# Patient Record
Sex: MALE | URBAN - METROPOLITAN AREA
[De-identification: names, ages, dates, MRNs, and addresses within clinical notes are randomized per-mention and may not be internally consistent; named-entity substitution may affect disease eponyms.]

---

## 2024-11-23 ENCOUNTER — CLAIMS CREATED FROM THE CLAIM WINDOW (OUTPATIENT)
Dept: URBAN - METROPOLITAN AREA MEDICAL CENTER 10 | Facility: MEDICAL CENTER | Age: 34
End: 2024-11-23

## 2024-11-23 PROCEDURE — 99254 IP/OBS CNSLTJ NEW/EST MOD 60: CPT | Performed by: INTERNAL MEDICINE

## 2024-11-23 PROCEDURE — 99254 IP/OBS CNSLTJ NEW/EST MOD 60: CPT | Performed by: PHYSICIAN ASSISTANT

## 2024-11-25 ENCOUNTER — TELEPHONE ENCOUNTER (OUTPATIENT)
Dept: URBAN - METROPOLITAN AREA CLINIC 37 | Facility: CLINIC | Age: 34
End: 2024-11-25

## 2024-11-26 ENCOUNTER — P2P PATIENT RECORD (OUTPATIENT)
Age: 34
End: 2024-11-26

## 2024-12-13 ENCOUNTER — LAB OUTSIDE AN ENCOUNTER (OUTPATIENT)
Dept: URBAN - METROPOLITAN AREA CLINIC 37 | Facility: CLINIC | Age: 34
End: 2024-12-13

## 2024-12-13 ENCOUNTER — DASHBOARD ENCOUNTERS (OUTPATIENT)
Age: 34
End: 2024-12-13

## 2024-12-13 ENCOUNTER — OFFICE VISIT (OUTPATIENT)
Dept: URBAN - METROPOLITAN AREA CLINIC 37 | Facility: CLINIC | Age: 34
End: 2024-12-13
Payer: COMMERCIAL

## 2024-12-13 VITALS — BODY MASS INDEX: 31.21 KG/M2 | HEIGHT: 70 IN | WEIGHT: 218 LBS

## 2024-12-13 DIAGNOSIS — F10.11 HISTORY OF ALCOHOL ABUSE: ICD-10-CM

## 2024-12-13 DIAGNOSIS — R74.8 ELEVATED LIVER ENZYMES: ICD-10-CM

## 2024-12-13 DIAGNOSIS — R93.5 ABNORMAL FINDINGS ON DIAGNOSTIC IMAGING OF OTHER ABDOMINAL REGIONS, INCLUDING RETROPERITONEUM: ICD-10-CM

## 2024-12-13 DIAGNOSIS — Z87.19 HISTORY OF PANCREATITIS: ICD-10-CM

## 2024-12-13 PROBLEM — 371434005: Status: ACTIVE | Noted: 2024-12-13

## 2024-12-13 PROCEDURE — 99203 OFFICE O/P NEW LOW 30 MIN: CPT | Performed by: NURSE PRACTITIONER

## 2024-12-13 RX ORDER — HYDROXYZINE PAMOATE 50 MG/1
TAKE 1 CAPSULE BY MOUTH TWICE DAILY AS NEEDED FOR ANXIETY CAPSULE ORAL
Qty: 30 EACH | Refills: 0 | Status: ACTIVE | COMMUNITY

## 2024-12-13 RX ORDER — BUPROPION HYDROCHLORIDE 150 MG/1
TABLET, EXTENDED RELEASE ORAL
Qty: 90 TABLET | Status: ACTIVE | COMMUNITY

## 2024-12-13 RX ORDER — CLONAZEPAM 1 MG/1
TAKE 1 TABLET BY MOUTH TWICE DAILY AS NEEDED FOR ANXIETY TABLET ORAL
Qty: 60 EACH | Refills: 2 | Status: ACTIVE | COMMUNITY

## 2024-12-13 RX ORDER — TRAZODONE HYDROCHLORIDE 100 MG/1
TABLET ORAL
Qty: 30 TABLET | Status: ACTIVE | COMMUNITY

## 2024-12-13 RX ORDER — DEXTROAMPHETAMINE SACCHARATE, AMPHETAMINE ASPARTATE, DEXTROAMPHETAMINE SULFATE AND AMPHETAMINE SULFATE 3.75; 3.75; 3.75; 3.75 MG/1; MG/1; MG/1; MG/1
TABLET ORAL
Qty: 60 TABLET | Status: ACTIVE | COMMUNITY

## 2024-12-13 NOTE — PHYSICAL EXAM CONSTITUTIONAL:
[Use of Plain Language] : use of plain language well developed, well nourished , in no acute distress , ambulating without difficulty , normal communication ability [Adequate] : adequate [None] : none [] : I have reviewed management goals with caretaker and provided a copy of care plan

## 2024-12-13 NOTE — PHYSICAL EXAM NEUROLOGIC:
oriented to person, place and time , normal sensation , short and long term memory intact Alert-The patient is alert, awake and responds to voice. The patient is oriented to time, place, and person. The triage nurse is able to obtain subjective information.

## 2024-12-13 NOTE — PHYSICAL EXAM GASTROINTESTINAL
Abdomen , soft, nontender, nondistended , no guarding or rigidity , no masses palpable , normal bowel sounds , Liver and Spleen , no hepatomegaly present , no hepatosplenomegaly , liver nontender , spleen not palpable
Self Administrated

## 2024-12-20 LAB
ABSOLUTE BASOPHILS: 22
ABSOLUTE EOSINOPHILS: 52
ABSOLUTE LYMPHOCYTES: 1754
ABSOLUTE MONOCYTES: 409
ABSOLUTE NEUTROPHILS: 2064
ANA SCREEN, IFA: NEGATIVE
BASOPHILS: 0.5
C-REACTIVE PROTEIN, QUANT: <3
CYCLIC CITRULLINATED PEPTIDE (CCP) AB (IGG): <16
EOSINOPHILS: 1.2
HEMATOCRIT: 42.1
HEMOGLOBIN: 14.2
IFE INTERPRETATION: (no result)
IGG, SUBCLASS 1: 416
IGG, SUBCLASS 2: 185
IGG, SUBCLASS 3: 60
IGG, SUBCLASS 4: 15.4
IMMUNOGLOBULIN A: 252
IMMUNOGLOBULIN G, QN, SERUM: 676
IMMUNOGLOBULIN G: 786
IMMUNOGLOBULIN M: 196
INTERPRETATION: (no result)
LIPASE: 14
LYMPHOCYTES: 40.8
MCH: 28.5
MCHC: 33.7
MCV: 84.5
MONOCYTES: 9.5
MPV: 9.7
NEUTROPHILS: 48
PLATELET COUNT: 271
RDW: 12.9
RED BLOOD CELL COUNT: 4.98
RHEUMATOID FACTOR: <10
WHITE BLOOD CELL COUNT: 4.3

## 2024-12-22 ENCOUNTER — TELEPHONE ENCOUNTER (OUTPATIENT)
Dept: URBAN - METROPOLITAN AREA CLINIC 35 | Facility: CLINIC | Age: 34
End: 2024-12-22

## 2024-12-26 LAB — PANCREATIC ELASTASE, FECAL: >800

## 2025-03-14 ENCOUNTER — CLAIMS CREATED FROM THE CLAIM WINDOW (OUTPATIENT)
Dept: URBAN - METROPOLITAN AREA MEDICAL CENTER 24 | Facility: MEDICAL CENTER | Age: 35
End: 2025-03-14
Payer: COMMERCIAL

## 2025-03-14 DIAGNOSIS — F19.10 POLYSUBSTANCE ABUSE: ICD-10-CM

## 2025-03-14 DIAGNOSIS — K86.3 CYST AND PSEUDOCYST OF PANCREAS: ICD-10-CM

## 2025-03-14 DIAGNOSIS — R74.01 ABNORMAL/ELEVATED TRANSAMINASE (SGOT, AMINOTRANSFERASE): ICD-10-CM

## 2025-03-14 DIAGNOSIS — K85.20 ACUTE ALCOHOLIC PANCREATITIS: ICD-10-CM

## 2025-03-14 PROCEDURE — 99222 1ST HOSP IP/OBS MODERATE 55: CPT | Performed by: INTERNAL MEDICINE

## 2025-03-14 PROCEDURE — 99254 IP/OBS CNSLTJ NEW/EST MOD 60: CPT | Performed by: INTERNAL MEDICINE

## 2025-03-14 PROCEDURE — G8427 DOCREV CUR MEDS BY ELIG CLIN: HCPCS | Performed by: INTERNAL MEDICINE

## 2025-03-15 ENCOUNTER — CLAIMS CREATED FROM THE CLAIM WINDOW (OUTPATIENT)
Dept: URBAN - METROPOLITAN AREA MEDICAL CENTER 24 | Facility: MEDICAL CENTER | Age: 35
End: 2025-03-15
Payer: COMMERCIAL

## 2025-03-15 DIAGNOSIS — K85.20 ACUTE ALCOHOLIC PANCREATITIS: ICD-10-CM

## 2025-03-15 DIAGNOSIS — F19.10 POLYSUBSTANCE ABUSE: ICD-10-CM

## 2025-03-15 DIAGNOSIS — K86.3 CYST AND PSEUDOCYST OF PANCREAS: ICD-10-CM

## 2025-03-15 DIAGNOSIS — R74.01 ABNORMAL/ELEVATED TRANSAMINASE (SGOT, AMINOTRANSFERASE): ICD-10-CM

## 2025-03-15 PROCEDURE — 99232 SBSQ HOSP IP/OBS MODERATE 35: CPT | Performed by: INTERNAL MEDICINE

## 2025-03-24 ENCOUNTER — LAB OUTSIDE AN ENCOUNTER (OUTPATIENT)
Dept: URBAN - METROPOLITAN AREA CLINIC 35 | Facility: CLINIC | Age: 35
End: 2025-03-24